# Patient Record
Sex: FEMALE | Race: BLACK OR AFRICAN AMERICAN | Employment: FULL TIME | URBAN - METROPOLITAN AREA
[De-identification: names, ages, dates, MRNs, and addresses within clinical notes are randomized per-mention and may not be internally consistent; named-entity substitution may affect disease eponyms.]

---

## 2021-07-21 ENCOUNTER — APPOINTMENT (OUTPATIENT)
Dept: NON INVASIVE DIAGNOSTICS | Age: 61
End: 2021-07-21
Attending: HOSPITALIST
Payer: COMMERCIAL

## 2021-07-21 ENCOUNTER — APPOINTMENT (OUTPATIENT)
Dept: GENERAL RADIOLOGY | Age: 61
End: 2021-07-21
Attending: EMERGENCY MEDICINE
Payer: COMMERCIAL

## 2021-07-21 ENCOUNTER — HOSPITAL ENCOUNTER (OUTPATIENT)
Age: 61
Setting detail: OBSERVATION
Discharge: HOME | End: 2021-07-22
Attending: EMERGENCY MEDICINE | Admitting: HOSPITALIST
Payer: COMMERCIAL

## 2021-07-21 DIAGNOSIS — R07.9 ACUTE CHEST PAIN: Primary | ICD-10-CM

## 2021-07-21 LAB
ALBUMIN SERPL-MCNC: 3.8 G/DL (ref 3.4–5)
ALBUMIN/GLOB SERPL: 0.9 {RATIO} (ref 0.8–1.7)
ALP SERPL-CCNC: 89 U/L (ref 45–117)
ALT SERPL-CCNC: 26 U/L (ref 13–56)
AMPHET UR QL SCN: NEGATIVE
ANION GAP SERPL CALC-SCNC: 4 MMOL/L (ref 3–18)
AST SERPL-CCNC: 21 U/L (ref 10–38)
ATRIAL RATE: 57 BPM
ATRIAL RATE: 68 BPM
ATRIAL RATE: 94 BPM
BARBITURATES UR QL SCN: NEGATIVE
BASOPHILS # BLD: 0 K/UL (ref 0–0.1)
BASOPHILS NFR BLD: 0 % (ref 0–2)
BENZODIAZ UR QL: NEGATIVE
BILIRUB SERPL-MCNC: 0.5 MG/DL (ref 0.2–1)
BUN SERPL-MCNC: 12 MG/DL (ref 7–18)
BUN/CREAT SERPL: 14 (ref 12–20)
CALCIUM SERPL-MCNC: 9 MG/DL (ref 8.5–10.1)
CALCULATED P AXIS, ECG09: 29 DEGREES
CALCULATED P AXIS, ECG09: 30 DEGREES
CALCULATED P AXIS, ECG09: 62 DEGREES
CALCULATED R AXIS, ECG10: 19 DEGREES
CALCULATED R AXIS, ECG10: 34 DEGREES
CALCULATED R AXIS, ECG10: 37 DEGREES
CALCULATED T AXIS, ECG11: 37 DEGREES
CALCULATED T AXIS, ECG11: 53 DEGREES
CALCULATED T AXIS, ECG11: 59 DEGREES
CANNABINOIDS UR QL SCN: NEGATIVE
CHLORIDE SERPL-SCNC: 105 MMOL/L (ref 100–111)
CK MB CFR SERPL CALC: 0.7 % (ref 0–4)
CK MB CFR SERPL CALC: 0.9 % (ref 0–4)
CK MB CFR SERPL CALC: 1 % (ref 0–4)
CK MB SERPL-MCNC: 1.1 NG/ML (ref 5–25)
CK MB SERPL-MCNC: 1.2 NG/ML (ref 5–25)
CK MB SERPL-MCNC: 1.8 NG/ML (ref 5–25)
CK SERPL-CCNC: 121 U/L (ref 26–192)
CK SERPL-CCNC: 172 U/L (ref 26–192)
CK SERPL-CCNC: 187 U/L (ref 26–192)
CO2 SERPL-SCNC: 30 MMOL/L (ref 21–32)
COCAINE UR QL SCN: NEGATIVE
CREAT SERPL-MCNC: 0.85 MG/DL (ref 0.6–1.3)
D DIMER PPP FEU-MCNC: 0.35 UG/ML(FEU)
DIAGNOSIS, 93000: NORMAL
DIFFERENTIAL METHOD BLD: ABNORMAL
ECHO AO ASC DIAM: 2.89 CM
ECHO AO ROOT DIAM: 3.29 CM
ECHO AV AREA PEAK VELOCITY: 2.76 CM2
ECHO AV AREA VTI: 2.46 CM2
ECHO AV AREA/BSA PEAK VELOCITY: 1.4 CM2/M2
ECHO AV AREA/BSA VTI: 1.3 CM2/M2
ECHO AV MEAN GRADIENT: 3.65 MMHG
ECHO AV PEAK GRADIENT: 7.16 MMHG
ECHO AV PEAK VELOCITY: 133.82 CM/S
ECHO AV VTI: 28.93 CM
ECHO IVC PROX: 1.36 CM
ECHO LA MAJOR AXIS: 3.22 CM
ECHO LA MINOR AXIS: 1.64 CM
ECHO LA VOL 2C: 49.14 ML (ref 22–52)
ECHO LA VOL 4C: 30.5 ML (ref 22–52)
ECHO LA VOL BP: 44.39 ML (ref 22–52)
ECHO LA VOL/BSA BIPLANE: 22.65 ML/M2 (ref 16–28)
ECHO LA VOLUME INDEX A2C: 25.07 ML/M2 (ref 16–28)
ECHO LA VOLUME INDEX A4C: 15.56 ML/M2 (ref 16–28)
ECHO LV E' LATERAL VELOCITY: 11 CM/S
ECHO LV E' SEPTAL VELOCITY: 7 CM/S
ECHO LV EDV A2C: 95.12 ML
ECHO LV EDV A4C: 94.78 ML
ECHO LV EDV BP: 95.94 ML (ref 56–104)
ECHO LV EDV INDEX A4C: 48.4 ML/M2
ECHO LV EDV INDEX BP: 48.9 ML/M2
ECHO LV EDV NDEX A2C: 48.5 ML/M2
ECHO LV EJECTION FRACTION A2C: 73 PERCENT
ECHO LV EJECTION FRACTION A4C: 51 PERCENT
ECHO LV EJECTION FRACTION BIPLANE: 63.7 PERCENT (ref 55–100)
ECHO LV ESV A2C: 26.12 ML
ECHO LV ESV A4C: 46.37 ML
ECHO LV ESV BP: 34.84 ML (ref 19–49)
ECHO LV ESV INDEX A2C: 13.3 ML/M2
ECHO LV ESV INDEX A4C: 23.7 ML/M2
ECHO LV ESV INDEX BP: 17.8 ML/M2
ECHO LV INTERNAL DIMENSION DIASTOLIC: 4.15 CM (ref 3.9–5.3)
ECHO LV INTERNAL DIMENSION SYSTOLIC: 2.6 CM
ECHO LV IVSD: 0.91 CM (ref 0.6–0.9)
ECHO LV MASS 2D: 119.1 G (ref 67–162)
ECHO LV MASS INDEX 2D: 60.7 G/M2 (ref 43–95)
ECHO LV POSTERIOR WALL DIASTOLIC: 0.92 CM (ref 0.6–0.9)
ECHO LVOT DIAM: 2.11 CM
ECHO LVOT PEAK GRADIENT: 4.49 MMHG
ECHO LVOT PEAK VELOCITY: 105.89 CM/S
ECHO LVOT SV: 69 ML
ECHO LVOT SV: 71.3 ML
ECHO LVOT VTI: 20.43 CM
ECHO MV A VELOCITY: 49.15 CM/S
ECHO MV AREA PHT: 3.13 CM2
ECHO MV E DECELERATION TIME (DT): 242.54 MS
ECHO MV E VELOCITY: 75.02 CM/S
ECHO MV E/A RATIO: 1.53
ECHO MV E/E' LATERAL: 6.82
ECHO MV E/E' RATIO (AVERAGED): 8.77
ECHO MV E/E' SEPTAL: 10.72
ECHO MV PRESSURE HALF TIME (PHT): 70.34 MS
ECHO RA AREA 4C: 14.42 CM2
ECHO RV INTERNAL DIMENSION: 4.07 CM
EOSINOPHIL # BLD: 0.1 K/UL (ref 0–0.4)
EOSINOPHIL NFR BLD: 1 % (ref 0–5)
ERYTHROCYTE [DISTWIDTH] IN BLOOD BY AUTOMATED COUNT: 13.2 % (ref 11.6–14.5)
GLOBULIN SER CALC-MCNC: 4.3 G/DL (ref 2–4)
GLUCOSE BLD STRIP.AUTO-MCNC: 115 MG/DL (ref 70–110)
GLUCOSE SERPL-MCNC: 86 MG/DL (ref 74–99)
HCT VFR BLD AUTO: 35.2 % (ref 35–45)
HDSCOM,HDSCOM: NORMAL
HGB BLD-MCNC: 11.7 G/DL (ref 12–16)
LVOT MG: 1.89 MMHG
LYMPHOCYTES # BLD: 3 K/UL (ref 0.9–3.6)
LYMPHOCYTES NFR BLD: 44 % (ref 21–52)
MCH RBC QN AUTO: 29.9 PG (ref 24–34)
MCHC RBC AUTO-ENTMCNC: 33.2 G/DL (ref 31–37)
MCV RBC AUTO: 90 FL (ref 74–97)
METHADONE UR QL: NEGATIVE
MONOCYTES # BLD: 0.5 K/UL (ref 0.05–1.2)
MONOCYTES NFR BLD: 7 % (ref 3–10)
NEUTS SEG # BLD: 3.3 K/UL (ref 1.8–8)
NEUTS SEG NFR BLD: 48 % (ref 40–73)
OPIATES UR QL: NEGATIVE
P-R INTERVAL, ECG05: 104 MS
P-R INTERVAL, ECG05: 122 MS
P-R INTERVAL, ECG05: 156 MS
PCP UR QL: NEGATIVE
PLATELET # BLD AUTO: 243 K/UL (ref 135–420)
PMV BLD AUTO: 12.4 FL (ref 9.2–11.8)
POTASSIUM SERPL-SCNC: 3.9 MMOL/L (ref 3.5–5.5)
PROT SERPL-MCNC: 8.1 G/DL (ref 6.4–8.2)
Q-T INTERVAL, ECG07: 442 MS
Q-T INTERVAL, ECG07: 442 MS
Q-T INTERVAL, ECG07: 488 MS
QRS DURATION, ECG06: 80 MS
QRS DURATION, ECG06: 80 MS
QRS DURATION, ECG06: 90 MS
QTC CALCULATION (BEZET), ECG08: 469 MS
QTC CALCULATION (BEZET), ECG08: 474 MS
QTC CALCULATION (BEZET), ECG08: 552 MS
RBC # BLD AUTO: 3.91 M/UL (ref 4.2–5.3)
SODIUM SERPL-SCNC: 139 MMOL/L (ref 136–145)
TROPONIN I SERPL-MCNC: <0.02 NG/ML (ref 0–0.04)
VENTRICULAR RATE, ECG03: 57 BPM
VENTRICULAR RATE, ECG03: 68 BPM
VENTRICULAR RATE, ECG03: 94 BPM
WBC # BLD AUTO: 6.9 K/UL (ref 4.6–13.2)

## 2021-07-21 PROCEDURE — 99218 HC RM OBSERVATION: CPT

## 2021-07-21 PROCEDURE — 82553 CREATINE MB FRACTION: CPT

## 2021-07-21 PROCEDURE — 74011000250 HC RX REV CODE- 250: Performed by: EMERGENCY MEDICINE

## 2021-07-21 PROCEDURE — 99285 EMERGENCY DEPT VISIT HI MDM: CPT

## 2021-07-21 PROCEDURE — 96372 THER/PROPH/DIAG INJ SC/IM: CPT

## 2021-07-21 PROCEDURE — 74011250637 HC RX REV CODE- 250/637: Performed by: INTERNAL MEDICINE

## 2021-07-21 PROCEDURE — 80307 DRUG TEST PRSMV CHEM ANLYZR: CPT

## 2021-07-21 PROCEDURE — 93005 ELECTROCARDIOGRAM TRACING: CPT

## 2021-07-21 PROCEDURE — 74011250636 HC RX REV CODE- 250/636: Performed by: EMERGENCY MEDICINE

## 2021-07-21 PROCEDURE — 74011250637 HC RX REV CODE- 250/637: Performed by: EMERGENCY MEDICINE

## 2021-07-21 PROCEDURE — 85379 FIBRIN DEGRADATION QUANT: CPT

## 2021-07-21 PROCEDURE — 74011250637 HC RX REV CODE- 250/637: Performed by: HOSPITALIST

## 2021-07-21 PROCEDURE — 96374 THER/PROPH/DIAG INJ IV PUSH: CPT

## 2021-07-21 PROCEDURE — 80053 COMPREHEN METABOLIC PANEL: CPT

## 2021-07-21 PROCEDURE — 82962 GLUCOSE BLOOD TEST: CPT

## 2021-07-21 PROCEDURE — 85025 COMPLETE CBC W/AUTO DIFF WBC: CPT

## 2021-07-21 PROCEDURE — 71045 X-RAY EXAM CHEST 1 VIEW: CPT

## 2021-07-21 PROCEDURE — 74011250636 HC RX REV CODE- 250/636: Performed by: HOSPITALIST

## 2021-07-21 PROCEDURE — 93306 TTE W/DOPPLER COMPLETE: CPT

## 2021-07-21 RX ORDER — PANTOPRAZOLE SODIUM 40 MG/1
40 TABLET, DELAYED RELEASE ORAL
Status: DISCONTINUED | OUTPATIENT
Start: 2021-07-22 | End: 2021-07-22 | Stop reason: HOSPADM

## 2021-07-21 RX ORDER — ACETAMINOPHEN 325 MG/1
650 TABLET ORAL
Status: DISCONTINUED | OUTPATIENT
Start: 2021-07-21 | End: 2021-07-22 | Stop reason: HOSPADM

## 2021-07-21 RX ORDER — NITROGLYCERIN 0.4 MG/1
0.4 TABLET SUBLINGUAL
Status: COMPLETED | OUTPATIENT
Start: 2021-07-21 | End: 2021-07-21

## 2021-07-21 RX ORDER — GUAIFENESIN 100 MG/5ML
81 LIQUID (ML) ORAL DAILY
Status: DISCONTINUED | OUTPATIENT
Start: 2021-07-22 | End: 2021-07-22 | Stop reason: HOSPADM

## 2021-07-21 RX ORDER — ENOXAPARIN SODIUM 100 MG/ML
40 INJECTION SUBCUTANEOUS EVERY 24 HOURS
Status: DISCONTINUED | OUTPATIENT
Start: 2021-07-21 | End: 2021-07-22 | Stop reason: HOSPADM

## 2021-07-21 RX ORDER — NITROGLYCERIN 0.4 MG/1
0.4 TABLET SUBLINGUAL
Status: DISCONTINUED | OUTPATIENT
Start: 2021-07-21 | End: 2021-07-22 | Stop reason: HOSPADM

## 2021-07-21 RX ORDER — AMLODIPINE BESYLATE 10 MG/1
TABLET ORAL DAILY
COMMUNITY

## 2021-07-21 RX ORDER — AMLODIPINE BESYLATE 5 MG/1
10 TABLET ORAL DAILY
Status: DISCONTINUED | OUTPATIENT
Start: 2021-07-22 | End: 2021-07-22 | Stop reason: HOSPADM

## 2021-07-21 RX ORDER — GUAIFENESIN 100 MG/5ML
243 LIQUID (ML) ORAL
Status: COMPLETED | OUTPATIENT
Start: 2021-07-21 | End: 2021-07-21

## 2021-07-21 RX ORDER — HEPARIN SODIUM 5000 [USP'U]/ML
5000 INJECTION, SOLUTION INTRAVENOUS; SUBCUTANEOUS EVERY 8 HOURS
Status: DISCONTINUED | OUTPATIENT
Start: 2021-07-21 | End: 2021-07-21

## 2021-07-21 RX ORDER — FAMOTIDINE 10 MG/ML
20 INJECTION INTRAVENOUS
Status: COMPLETED | OUTPATIENT
Start: 2021-07-21 | End: 2021-07-21

## 2021-07-21 RX ORDER — NITROGLYCERIN 20 MG/1
1 PATCH TRANSDERMAL DAILY
Status: DISCONTINUED | OUTPATIENT
Start: 2021-07-22 | End: 2021-07-22 | Stop reason: HOSPADM

## 2021-07-21 RX ORDER — ACETAMINOPHEN 325 MG/1
650 TABLET ORAL 2 TIMES DAILY
Status: DISCONTINUED | OUTPATIENT
Start: 2021-07-21 | End: 2021-07-22 | Stop reason: HOSPADM

## 2021-07-21 RX ADMIN — ASPIRIN 243 MG: 81 TABLET, CHEWABLE ORAL at 09:33

## 2021-07-21 RX ADMIN — FAMOTIDINE 20 MG: 10 INJECTION, SOLUTION INTRAVENOUS at 11:27

## 2021-07-21 RX ADMIN — ALUMINUM HYDROXIDE, MAGNESIUM HYDROXIDE, AND SIMETHICONE 40 ML: 200; 200; 20 SUSPENSION ORAL at 11:27

## 2021-07-21 RX ADMIN — ACETAMINOPHEN 650 MG: 325 TABLET ORAL at 18:45

## 2021-07-21 RX ADMIN — NITROGLYCERIN 0.4 MG: 0.4 TABLET, ORALLY DISINTEGRATING SUBLINGUAL at 09:34

## 2021-07-21 RX ADMIN — ENOXAPARIN SODIUM 40 MG: 40 INJECTION SUBCUTANEOUS at 13:53

## 2021-07-21 RX ADMIN — NITROGLYCERIN 0.4 MG: 0.4 TABLET, ORALLY DISINTEGRATING SUBLINGUAL at 17:10

## 2021-07-21 NOTE — ED PROVIDER NOTES
EMERGENCY DEPARTMENT HISTORY AND PHYSICAL EXAM    Date: 2021  Patient Name: Fritz Ayers    History of Presenting Illness     Chief Complaint   Patient presents with    Chest Pain         History Provided By: Patient    8:54 AM  Fritz Ayers is a 64 y.o. female with PMHX of hypertension who presents to the emergency department C/O chest pain. Per patient when she woke this morning between 6 and 630 she noticed pain in the left side of her chest.  She describes it as sharp and rates it a 7 out of 10 without clear relieving or exacerbating factors. She reports associated with some shortness of breath. She denies any fever, cough, nausea, vomiting, lower extremity edema, sick contacts, recent travel. She does not smoke. She has been vaccinated against COVID-19. She report her mother  from a heart attack in her 46s. PCP: Sunil Pereira MD    Current Facility-Administered Medications   Medication Dose Route Frequency Provider Last Rate Last [facility] ON 2021] aspirin chewable tablet 81 mg  81 mg Oral DAILY Andrew Pacheco MD        nitroglycerin (NITROSTAT) tablet 0.4 mg  0.4 mg SubLINGual Q5MIN PRN Andrew Pacheco MD        acetaminophen (TYLENOL) tablet 650 mg  650 mg Oral Q4H PRN Andrew Pacheco MD        heparin (porcine) injection 5,000 Units  5,000 Units SubCUTAneous Q8H Andrew Pacheco MD        enoxaparin (LOVENOX) injection 40 mg  40 mg SubCUTAneous Q24H Andrew Pacheco MD         Current Outpatient Medications   Medication Sig Dispense Refill    LOSARTAN PO Take  by mouth.  amLODIPine (NORVASC) 10 mg tablet Take  by mouth daily. Past History     Past Medical History:  Past Medical History:   Diagnosis Date    Hypertension        Past Surgical History:  History reviewed. No pertinent surgical history. Family History:  History reviewed. No pertinent family history.     Social History:  Social History     Tobacco Use    Smoking status: Never Smoker    Smokeless tobacco: Never Used Substance Use Topics    Alcohol use: Yes    Drug use: Never       Allergies: Allergies   Allergen Reactions    Morphine Itching         Review of Systems   Review of Systems   Constitutional: Negative for fever. Respiratory: Positive for shortness of breath. Cardiovascular: Positive for chest pain. Gastrointestinal: Negative for abdominal pain. All other systems reviewed and are negative.         Physical Exam     Vitals:    07/21/21 1200 07/21/21 1215 07/21/21 1230 07/21/21 1300   BP: (!) 148/67 (!) 127/112 (!) 150/76 (!) 158/74   Pulse: (!) 56 (!) 59 (!) 57 67   Resp: 16 15 13 12   Temp:       SpO2:       Weight:       Height:         Physical Exam    Nursing notes and vital signs reviewed    Constitutional: Non toxic appearing, moderate distress  Head: Normocephalic, Atraumatic  Eyes: EOMI  Neck: Supple  Cardiovascular: Regular rate and rhythm, no murmurs, rubs, or gallops  Chest: Normal work of breathing and chest excursion bilaterally  Lungs: Clear to ausculation bilaterally  Abdomen: Soft, non tender, non distended  Back: No evidence of trauma or deformity  Extremities: No evidence of trauma or deformity, mild bilateral LE edema  Skin: Warm and dry, normal cap refill  Neuro: Alert and appropriate  Psychiatric: Normal mood and affect      Diagnostic Study Results     Labs -     Recent Results (from the past 12 hour(s))   EKG, 12 LEAD, INITIAL    Collection Time: 07/21/21  8:58 AM   Result Value Ref Range    Ventricular Rate 68 BPM    Atrial Rate 68 BPM    P-R Interval 104 ms    QRS Duration 90 ms    Q-T Interval 442 ms    QTC Calculation (Bezet) 469 ms    Calculated P Axis 30 degrees    Calculated R Axis 34 degrees    Calculated T Axis 59 degrees    Diagnosis       Sinus rhythm with short [place]  Septal infarct , age undetermined  Abnormal ECG  No previous ECGs available     CBC WITH AUTOMATED DIFF    Collection Time: 07/21/21  9:30 AM   Result Value Ref Range    WBC 6.9 4.6 - 13.2 K/uL    RBC 3.91 (L) 4.20 - 5.30 M/uL    HGB 11.7 (L) 12.0 - 16.0 g/dL    HCT 35.2 35.0 - 45.0 %    MCV 90.0 74.0 - 97.0 FL    MCH 29.9 24.0 - 34.0 PG    MCHC 33.2 31.0 - 37.0 g/dL    RDW 13.2 11.6 - 14.5 %    PLATELET 979 451 - 923 K/uL    MPV 12.4 (H) 9.2 - 11.8 FL    NEUTROPHILS 48 40 - 73 %    LYMPHOCYTES 44 21 - 52 %    MONOCYTES 7 3 - 10 %    EOSINOPHILS 1 0 - 5 %    BASOPHILS 0 0 - 2 %    ABS. NEUTROPHILS 3.3 1.8 - 8.0 K/UL    ABS. LYMPHOCYTES 3.0 0.9 - 3.6 K/UL    ABS. MONOCYTES 0.5 0.05 - 1.2 K/UL    ABS. EOSINOPHILS 0.1 0.0 - 0.4 K/UL    ABS. BASOPHILS 0.0 0.0 - 0.1 K/UL    DF AUTOMATED     METABOLIC PANEL, COMPREHENSIVE    Collection Time: 07/21/21  9:30 AM   Result Value Ref Range    Sodium 139 136 - 145 mmol/L    Potassium 3.9 3.5 - 5.5 mmol/L    Chloride 105 100 - 111 mmol/L    CO2 30 21 - 32 mmol/L    Anion gap 4 3.0 - 18 mmol/L    Glucose 86 74 - 99 mg/dL    BUN 12 7.0 - 18 MG/DL    Creatinine 0.85 0.6 - 1.3 MG/DL    BUN/Creatinine ratio 14 12 - 20      GFR est AA >60 >60 ml/min/1.73m2    GFR est non-AA >60 >60 ml/min/1.73m2    Calcium 9.0 8.5 - 10.1 MG/DL    Bilirubin, total 0.5 0.2 - 1.0 MG/DL    ALT (SGPT) 26 13 - 56 U/L    AST (SGOT) 21 10 - 38 U/L    Alk.  phosphatase 89 45 - 117 U/L    Protein, total 8.1 6.4 - 8.2 g/dL    Albumin 3.8 3.4 - 5.0 g/dL    Globulin 4.3 (H) 2.0 - 4.0 g/dL    A-G Ratio 0.9 0.8 - 1.7     D DIMER    Collection Time: 07/21/21  9:30 AM   Result Value Ref Range    D DIMER 0.35 <0.46 ug/ml(FEU)   CARDIAC PANEL,(CK, CKMB & TROPONIN)    Collection Time: 07/21/21  9:30 AM   Result Value Ref Range    CK - MB 1.8 <3.6 ng/ml    CK-MB Index 1.0 0.0 - 4.0 %     26 - 192 U/L    Troponin-I, QT <0.02 0.0 - 0.045 NG/ML   CARDIAC PANEL,(CK, CKMB & TROPONIN)    Collection Time: 07/21/21 11:00 AM   Result Value Ref Range    CK - MB 1.2 <3.6 ng/ml    CK-MB Index 0.7 0.0 - 4.0 %     26 - 192 U/L    Troponin-I, QT <0.02 0.0 - 0.045 NG/ML   EKG, 12 LEAD, SUBSEQUENT    Collection Time: 07/21/21 11:32 AM   Result Value Ref Range    Ventricular Rate 57 BPM    Atrial Rate 57 BPM    P-R Interval 156 ms    QRS Duration 80 ms    Q-T Interval 488 ms    QTC Calculation (Bezet) 474 ms    Calculated P Axis 62 degrees    Calculated R Axis 37 degrees    Calculated T Axis 53 degrees    Diagnosis       Sinus bradycardia with sinus arrhythmia  Otherwise normal ECG  When compared with ECG of 21-JUL-2021 08:58,  MI interval has increased         Radiologic Studies -   XR CHEST PORT   Final Result      No acute cardiopulmonary abnormality. CT Results  (Last 48 hours)    None        CXR Results  (Last 48 hours)               07/21/21 0914  XR CHEST PORT Final result    Impression:      No acute cardiopulmonary abnormality. Narrative:  EXAM: XR CHEST PORT       CLINICAL INDICATION/HISTORY: chest pain   -Additional: None       COMPARISON: None       TECHNIQUE: Portable frontal view of the chest       _______________       FINDINGS:       SUPPORT DEVICES: None. HEART AND MEDIASTINUM: Cardiomediastinal silhouette within normal limits.        LUNGS AND PLEURAL SPACES: No dense consolidation, large effusion or   pneumothorax.       _______________                 Medications given in the ED-  Medications   aspirin chewable tablet 81 mg (has no administration in time range)   nitroglycerin (NITROSTAT) tablet 0.4 mg (has no administration in time range)   acetaminophen (TYLENOL) tablet 650 mg (has no administration in time range)   heparin (porcine) injection 5,000 Units (has no administration in time range)   enoxaparin (LOVENOX) injection 40 mg (has no administration in time range)   aspirin chewable tablet 243 mg (243 mg Oral Given 7/21/21 0933)   nitroglycerin (NITROSTAT) tablet 0.4 mg (0.4 mg SubLINGual Given 7/21/21 0934)   famotidine (PF) (PEPCID) injection 20 mg (20 mg IntraVENous Given 7/21/21 1127)   mylanta/viscous lidocaine (GI COCKTAIL) (40 mL Oral Given 7/21/21 1127)         Medical Decision Making I am the first provider for this patient. I reviewed the vital signs, available nursing notes, past medical history, past surgical history, family history and social history. Vital Signs-Reviewed the patient's vital signs. Pulse Oximetry Analysis - 99% on room air, not hypoxic     Cardiac Monitor:  Rate: 75 bpm  Rhythm: Normal sinus    EKG interpretation: (Preliminary)  EKG read by Dr. Jaqui Pack at 9:02 AM  Sinus rhythm at a rate of 68 bpm, GA interval 104 ms, QRS duration of 90 ms, no prior available for comparison    Repeat EKG interpretation: (Preliminary)  EKG read by Dr. Jaqui Pack at 7:38 AM  Sinus bradycardia at a rate of 56 bpm, GA interval 120 ms, QRS duration of 78 ms, no significant change when compared to prior    Records Reviewed: Nursing Notes    Provider Notes (Medical Decision Making): Shanita Gutierrez is a 64 y.o. female presenting for chest pain that started today. Patient has some risk factors including hypertension and significant family history of heart disease in her mother. Cardiac enzymes and EKG negative x2. Wells low risk, PERC positive, D-dimer negative. Patient hemodynamically stable and chest pain improved here but has not completely resolved. Discussed with cardiology and hospitalist for further in-hospital evaluation and management. Patient and her  understand and agree with this plan. Procedures:  Procedures    ED Course:   10:24 AM  Updated patient on all results and plan. All questions answered. Reports that her pain has improved to 4 out of 10 from originally 7 out of 10 but has not completely resolved. CONSULT NOTE:   10:38 AM  Dr. Jaqui Pack spoke with Dr. Robert Graham   Specialty: Cardiology  Discussed pt's hx, disposition, and available diagnostic and imaging results over the telephone. Reviewed care plans. Repeat EKG and cardiac panel, trial GI cocktail.        CONSULT NOTE:   12:43 PM  Dr. Jaqui Pack spoke with Dr. Robert Graham   Specialty: Cardiology  Discussed pt's hx, disposition, and available diagnostic and imaging results over the telephone. Reviewed care plans. Will consult on the patient and arrange for further in hospital cardiac risk stratification. 12:46 PM  Updated patient on all results and plan. All questions answered. CONSULT NOTE:   12:59 PM  Dr. Tremaine Almazan spoke with Dr. Erica Moreno   Specialty: Hospitalist  Discussed pt's hx, disposition, and available diagnostic and imaging results over the telephone. Reviewed care plans. Accepts to telemetry. Diagnosis and Disposition     Critical Care Time: None    Core Measures:  For Hospitalized Patients:    1. Hospitalization Decision Time:  The decision to hospitalize the patient was made by Dr. Tremaine Almazan at 12:46 PM on 7/21/2021    2. Aspirin: Aspirin was given    12:47 PM  Patient is being admitted to the hospital by Dr. Erica Moreno. The results of their tests and reasons for their admission have been discussed with them and/or available family. They convey agreement and understanding for the need to be admitted and for their admission diagnosis. CONDITIONS ON ADMISSION:  Sepsis is not present at the time of admission. Deep Vein Thrombosis is not present at the time of admission. Thrombosis is not present at the time of admission. Urinary Tract Infection is not present at the time of admission. Pneumonia is not present at the time of admission. MRSA is not present at the time of admission. Wound infection is not present at the time of admission. Pressure Ulcer is not present at the time of admission. CLINICAL IMPRESSION:    1. Acute chest pain      _______________________________      Please note that this dictation was completed with [employer], the [employer] voice recognition software. Quite often unanticipated grammatical, syntax, homophones, and other interpretive errors are inadvertently transcribed by the computer software. Please disregard these errors.   Please excuse any errors that have escaped final proofreading.

## 2021-07-21 NOTE — ED NOTES
Spoke with provider Kenan Urias to clarify order if pt needed to be on both heparin and lovenox sc. Provider to change order does not need to be on both, and will update when next set of labs are do for cardiac panel.

## 2021-07-21 NOTE — H&P
History & Physical    Patient: Ras Quigley MRN: [id number]  CSN: [id number]    YOB: 1960  Age: 64 y.o. Sex: female      DOA: 7/21/2021  Primary Care Provider:  Gaurav Correa MD      Assessment/Plan     Hospital Problems  Never Reviewed        Codes Class Noted POA    Chest pain ICD-10-CM: R07.9  ICD-9-CM: 786.50  7/21/2021 Unknown        Hypertension ICD-10-CM: I10  ICD-9-CM: 401.9  Unknown Unknown        GERD (gastroesophageal reflux disease) ICD-10-CM: K21.9  ICD-9-CM: 530.81  Unknown Unknown                Admit to tele   Chest pain  Cardiac monitor, ce trend need to r/o acs   Ekg: no st change at this point,  nitro prn for chest pain  Cardiology consult   Echo done in ER   Need cath vs stress test will defer to cardiology        HTN, accelerated  Continue home medication. gerd ppi      full code   Please note that this dictation was completed with [employer], the computer voice recognition software. Quite often unanticipated grammatical, syntax, homophones, and other interpretive errors are inadvertently transcribed by the computer software. Please disregard these errors. Please excuse any errors that have escaped final proofreading    Estimate  length of stay : 1-2 days     DVT : heparin ppi proph  CC: chest pain        HPI:     Ras Quigley is a 64 y.o. female with history of hypertension presented to ER due to chest pain this morning. The chest pain waking her up this morning. It was sharp pain left side of the chest, no palpitation, no shortness of breath. She rested her self and chest pain did not go away. She decided to come to the hospital. She reported that she had on and off chest pain before , but resolved per its own. It does not go away this time. On arrival, Aspirin was administrated with  Nitro and GI cocktail which  helps with the pain. First cardiac enzyme and D-dimer were negative. EKG no ST segment changes. Chest x-ray no acute process.   She reported she had gerd, drinks alcohol occasionally. Quitted smoking several years ago. Mother  from heart attack at age of 48. She had covid last December-not required hospitalization. She received covid vaccination.  was at the bedside. Visit Vitals  BP (!) 159/78   Pulse 77   Temp 97.1 °F (36.2 °C)   Resp 12   Ht 5' 5\" (1.651 m)   Wt 88.9 kg (196 lb)   SpO2 100%   BMI 32.62 kg/m²      O2 Device: None (Room air)      Past Medical History:   Diagnosis Date    Hypertension      Surgical History    Surgery Date Site/Laterality Comments   CHOLECYSTECTOMY 2015 - 2015        THYROIDECTOMY, PARTIAL          OTHER SURGICAL HISTORY     liver laceration     IN LAP,SURG,COLECTOMY, PARTIAL, W/ANAST 2/10/2020 Abdomen/N/A Procedure: COLON RESECTION - LAPAROSCOPIC RIGHT; Surgeon: Nikolai Sofia MD; Location: [facility] Main OR; Service:  [facility], [address] History    Medical History Date Comments   Stroke (CMS/HCC)       TIA (transient ischemic attack)       Hypertension       Serrated adenoma of colon       Anemia       Chest pain       Anxiety       Family History    Medical History Relation Name Comments   Brain Aneurysm Father       Dementia Father       Hypertension Father       Heart attack Mother       Hypertension Mother       Lupus Sister         Relation Name Status Comments   Father        Mother        Sister   Alive       Social History     Socioeconomic History    Marital status:      Spouse name: Not on file    Number of children: Not on file    Years of education: Not on file    Highest education level: Not on file   Tobacco Use    Smoking status: Never Smoker    Smokeless tobacco: Never Used   Substance and Sexual Activity    Alcohol use:  Yes    Drug use: Never     Social Determinants of Health     Financial Resource Strain:     Difficulty of Paying Living Expenses:    Food Insecurity:     Worried About Running Out of Food in the Last Year:     [address] in the Last Year:    Transportation Needs:     Lack of Transportation (Medical):  Lack of Transportation (Non-Medical):    Physical Activity:     Days of Exercise per Week:     Minutes of Exercise per Session:    Stress:     Feeling of Stress :    Social Connections:     Frequency of Communication with Friends and Family:     Frequency of Social Gatherings with Friends and Family:     Attends [employer] Services:     Active Member of Clubs or Organizations:     Attends Club or Organization Meetings:     Marital Status:        Prior to Admission medications    Medication Sig Start Date End Date Taking? Authorizing Provider   LOSARTAN PO Take  by mouth. Yes Other, MD Clarita   amLODIPine (NORVASC) 10 mg tablet Take  by mouth daily. Yes Other, MD Clarita       Allergies   Allergen Reactions    Morphine Itching       Review of Systems  Gen: No fever, chills, malaise, weight loss/gain. Heent: No headache, rhinorrhea, epistaxis, ear pain, hearing loss, sinus pain, neck pain/stiffness, sore throat. Heart: + chest pain, no palpitations, CERVANTES, pnd, or orthopnea. Resp: No cough, hemoptysis, wheezing and shortness of breath. GI: No nausea, vomiting, diarrhea, constipation, melena or hematochezia. : No urinary obstruction, dysuria or hematuria. Derm: No rash, new skin lesion or pruritis. Musc/skeletal: no bone or joint complains. Vasc: No edema, cyanosis or claudication. Endo: No heat/cold intolerance, no polyuria,polydipsia or polyphagia. Neuro: No unilateral weakness, numbness, tingling. No seizures. Heme: No easy bruising or bleeding. Physical Exam:     Physical Exam:  Visit Vitals  BP (!) 159/78   Pulse 77   Temp 97.1 °F (36.2 °C)   Resp 12   Ht 5' 5\" (1.651 m)   Wt 88.9 kg (196 lb)   SpO2 100%   BMI 32.62 kg/m²      O2 Device: None (Room air)    Temp (24hrs), Av.1 °F (36.2 °C), Min:97.1 °F (36.2 °C), Max:97.1 °F (36.2 °C)    No intake/output data recorded.    No intake/output data recorded. General:  Awake, cooperative, no distress. Head:  Normocephalic, without obvious abnormality, atraumatic. Eyes:  Conjunctivae/corneas clear, sclera anicteric, PERRL, EOMs intact. Nose: Nares normal. No drainage or sinus tenderness. Throat: Lips, mucosa, and tongue normal. .   Neck: Supple, symmetrical, trachea midline, no adenopathy. Lungs:   Clear to auscultation bilaterally. Heart:  Regular rate and rhythm, S1, S2 normal, no murmur, click, rub or gallop. Abdomen: Soft, non-tender. Bowel sounds normal. No masses,  No organomegaly. Extremities: Extremities normal, atraumatic, no cyanosis or edema. Pulses: 2+ and symmetric all extremities. Skin: Skin color-pink, texture, turgor normal. No rashes or lesions. Capillary refill normal    Neurologic: CNII-XII intact. No focal motor or sensory deficit.        Labs Reviewed:    BMP:   Lab Results   Component Value Date/Time     07/21/2021 09:30 AM    K 3.9 07/21/2021 09:30 AM     07/21/2021 09:30 AM    CO2 30 07/21/2021 09:30 AM    AGAP 4 07/21/2021 09:30 AM    GLU 86 07/21/2021 09:30 AM    BUN 12 07/21/2021 09:30 AM    CREA 0.85 07/21/2021 09:30 AM    GFRAA >60 07/21/2021 09:30 AM    GFRNA >60 07/21/2021 09:30 AM     CMP:   Lab Results   Component Value Date/Time     07/21/2021 09:30 AM    K 3.9 07/21/2021 09:30 AM     07/21/2021 09:30 AM    CO2 30 07/21/2021 09:30 AM    AGAP 4 07/21/2021 09:30 AM    GLU 86 07/21/2021 09:30 AM    BUN 12 07/21/2021 09:30 AM    CREA 0.85 07/21/2021 09:30 AM    GFRAA >60 07/21/2021 09:30 AM    GFRNA >60 07/21/2021 09:30 AM    CA 9.0 07/21/2021 09:30 AM    ALB 3.8 07/21/2021 09:30 AM    TP 8.1 07/21/2021 09:30 AM    GLOB 4.3 (H) 07/21/2021 09:30 AM    AGRAT 0.9 07/21/2021 09:30 AM    ALT 26 07/21/2021 09:30 AM     CBC:   Lab Results   Component Value Date/Time    WBC 6.9 07/21/2021 09:30 AM    HGB 11.7 (L) 07/21/2021 09:30 AM    HCT 35.2 07/21/2021 09:30 AM     07/21/2021 09:30 AM     All Cardiac Markers in the last 24 hours:   Lab Results   Component Value Date/Time     07/21/2021 11:00 AM     07/21/2021 09:30 AM    CKMB 1.2 07/21/2021 11:00 AM    CKMB 1.8 07/21/2021 09:30 AM    CKND1 0.7 07/21/2021 11:00 AM    CKND1 1.0 07/21/2021 09:30 AM    TROIQ <0.02 07/21/2021 11:00 AM    TROIQ <0.02 07/21/2021 09:30 AM     Recent Glucose Results:   Lab Results   Component Value Date/Time    GLU 86 07/21/2021 09:30 AM     ABG: No results found for: PH, PHI, PCO2, PCO2I, PO2, PO2I, HCO3, HCO3I, FIO2, FIO2I  COAGS: No results found for: APTT, PTP, INR, INREXT, INREXT  Liver Panel:   Lab Results   Component Value Date/Time    ALB 3.8 07/21/2021 09:30 AM    TP 8.1 07/21/2021 09:30 AM    GLOB 4.3 (H) 07/21/2021 09:30 AM    AGRAT 0.9 07/21/2021 09:30 AM    ALT 26 07/21/2021 09:30 AM    AP 89 07/21/2021 09:30 AM     Pancreatic Markers: No results found for: AMYLPOCT, AML, LIPPOCT, LPSE    XR CHEST PORT    Result Date: 7/21/2021  EXAM: XR CHEST PORT CLINICAL INDICATION/HISTORY: chest pain -Additional: None COMPARISON: None TECHNIQUE: Portable frontal view of the chest _______________ FINDINGS: SUPPORT DEVICES: None. HEART AND MEDIASTINUM: Cardiomediastinal silhouette within normal limits. LUNGS AND PLEURAL SPACES: No dense consolidation, large effusion or pneumothorax. _______________     No acute cardiopulmonary abnormality.     Procedures/imaging: see electronic medical records for all procedures/Xrays and details which were not copied into this note but were reviewed prior to creation of Slime Jesus MD, Internal Medicine     CC: Colman Ganser, MD

## 2021-07-21 NOTE — ROUTINE PROCESS
TRANSFER - OUT REPORT:    Verbal report given to colton(name) on Ignacia Chow  being transferred to telemetry(unit) for routine progression of care       Report consisted of patients Situation, Background, Assessment and   Recommendations(SBAR). Information from the following report(s) SBAR, ED Summary, MAR and Cardiac Rhythm NSR with Sinus arrhythmia was reviewed with the receiving nurse. Lines:   Peripheral IV 07/21/21 Right Forearm (Active)   Site Assessment Clean, dry, & intact 07/21/21 0932   Phlebitis Assessment 0 07/21/21 0932   Infiltration Assessment 0 07/21/21 0932   Dressing Status Clean, dry, & intact 07/21/21 0932   Dressing Type Transparent 07/21/21 0932        Opportunity for questions and clarification was provided.       Patient transported with:   Monitor  Registered Nurse

## 2021-07-21 NOTE — ED NOTES
Pt pending inpatient admission. clarified with provider Anest pt ok to have cardiac diet. Crackers and water given to pt.

## 2021-07-21 NOTE — CONSULTS
[facility] Consult Note      Patient: Iram Lora MRN: [id number]  SSN: [id number]    YOB: 1960  Age: 64 y.o. Sex: female        Date of Consultation: 7/21/2021  Referring Physician: Dr. Lucille Kehr  Reason for Consultation: chest pain. HPI:  I was asked by Dr. Lucille Kehr for chest pain. Karthikeyan scales is a 74-year-old pleasant patient with history of hypertension and family history of CAD came to the hospital with recurrent episode of chest pain since morning. Chest pain is precordial with in location intermittent aching and partially reproducible without any associated with shortness of breath, palpitation, presyncope and syncope. Patient have stress test and echocardiogram done in 2018 according to the patient in [facility]. Past Medical History:   Diagnosis Date    Hypertension      History reviewed. No pertinent surgical history. Current Facility-Administered Medications   Medication Dose Route Frequency    [START ON 7/22/2021] aspirin chewable tablet 81 mg  81 mg Oral DAILY    nitroglycerin (NITROSTAT) tablet 0.4 mg  0.4 mg SubLINGual Q5MIN PRN    acetaminophen (TYLENOL) tablet 650 mg  650 mg Oral Q4H PRN    enoxaparin (LOVENOX) injection 40 mg  40 mg SubCUTAneous Q24H    [START ON 7/22/2021] amLODIPine (NORVASC) tablet 10 mg  10 mg Oral DAILY    [START ON 7/22/2021] pantoprazole (PROTONIX) tablet 40 mg  40 mg Oral ACB    [START ON 7/22/2021] nitroglycerin (NITRODUR) 0.1 mg/hr patch 1 Patch  1 Patch TransDERmal DAILY    acetaminophen (TYLENOL) tablet 650 mg  650 mg Oral BID     Current Outpatient Medications   Medication Sig    LOSARTAN PO Take  by mouth.  amLODIPine (NORVASC) 10 mg tablet Take  by mouth daily. Allergies and Intolerances: Allergies   Allergen Reactions    Morphine Itching       Family History:   History reviewed. No pertinent family history. Social History:   She  reports that she has never smoked.  She has never used smokeless tobacco. She  reports current alcohol use. Review of Systems  Gen: No fever, chills, malaise, weight loss/gain. Heent: No headache, rhinorrhea, epistaxis, ear pain, hearing loss, sinus pain, neck pain/stiffness, sore throat. Heart: No chest pain, palpitations, CERVANTES, pnd, or orthopnea. Resp: No cough, hemoptysis, wheezing and shortness of breath. GI: No nausea, vomiting, diarrhea, constipation, melena or hematochezia. : No urinary obstruction, dysuria or hematuria. Derm: No rash, new skin lesion or pruritis. Musc/skeletal: no bone or joint complains. Vasc: No edema, cyanosis or claudication. Endo: No heat/cold intolerance, no polyuria,polydipsia or polyphagia. Neuro: No unilateral weakness, numbness, tingling. No seizures. Heme: No easy bruising or bleeding. Physical:   Patient Vitals for the past 6 hrs:   Temp Pulse Resp BP   07/21/21 1730 98 °F (36.7 °C) 72 14 130/69   07/21/21 1700 -- 76 14 (!) 140/72   07/21/21 1630 -- 77 12 (!) 159/78   07/21/21 1509 -- -- -- (!) 159/80   07/21/21 1500 -- 78 17 (!) 141/73   07/21/21 1430 -- 71 18 138/74   07/21/21 1400 -- 66 17 139/64   07/21/21 1300 -- 67 12 (!) 158/74   07/21/21 1230 -- (!) 57 13 (!) 150/76   07/21/21 1215 -- (!) 59 15 (!) 127/112   07/21/21 1200 -- (!) 56 16 (!) 148/67         Exam:   General Appearance: Comfortable, not using accessory muscles of respiration. HEENT: BILLY. HEAD: Atraumatic  NECK: No JVD, no thyroidomeglay. LUNGS: Clear bilaterally. HEART: S1+S2     ABD: Non-tender, BS Audible    EXT: No edema, and no cysnosis. VASCULAR EXAM: Pulses are intact. PSYCHIATRIC EXAM: Mood is appropriate. MUSCULOSKELETAL: Grossly no joint deformity. NEUROLOGICAL: Motor and sensory sytem intact and Cranial nerves II-XII intact.     Review of Data:   LABS:   Lab Results   Component Value Date/Time    WBC 6.9 07/21/2021 09:30 AM    HGB 11.7 (L) 07/21/2021 09:30 AM    HCT 35.2 07/21/2021 09:30 AM    PLATELET 267 47/64/6879 09:30 AM     Lab Results   Component Value Date/Time    Sodium 139 07/21/2021 09:30 AM    Potassium 3.9 07/21/2021 09:30 AM    Chloride 105 07/21/2021 09:30 AM    CO2 30 07/21/2021 09:30 AM    Glucose 86 07/21/2021 09:30 AM    BUN 12 07/21/2021 09:30 AM    Creatinine 0.85 07/21/2021 09:30 AM     No results found for: CHOL, CHOLX, CHLST, CHOLV, HDL, HDLP, LDL, LDLC, DLDLP, TGLX, TRIGL, TRIGP  No components found for: GPT  No results found for: HBA1C, DLF8CYNB, YWS1TLWO    RADIOLOGY:  CT Results  (Last 48 hours)    None        CXR Results  (Last 48 hours)               07/21/21 0914  XR CHEST PORT Final result    Impression:      No acute cardiopulmonary abnormality. Narrative:  EXAM: XR CHEST PORT       CLINICAL INDICATION/HISTORY: chest pain   -Additional: None       COMPARISON: None       TECHNIQUE: Portable frontal view of the chest       _______________       FINDINGS:       SUPPORT DEVICES: None. HEART AND MEDIASTINUM: Cardiomediastinal silhouette within normal limits.        LUNGS AND PLEURAL SPACES: No dense consolidation, large effusion or   pneumothorax.       _______________                   Cardiology Procedures:   Results for orders placed or performed during the hospital encounter of 07/21/21   EKG, 12 LEAD, INITIAL   Result Value Ref Range    Ventricular Rate 68 BPM    Atrial Rate 68 BPM    P-R Interval 104 ms    QRS Duration 90 ms    Q-T Interval 442 ms    QTC Calculation (Bezet) 469 ms    Calculated P Axis 30 degrees    Calculated R Axis 34 degrees    Calculated T Axis 59 degrees    Diagnosis       Sinus rhythm with short ME  Septal infarct , age undetermined  Abnormal ECG  No previous ECGs available        Echo Results  (Last 48 hours)    None       Cardiolite (Tc-99m Sestamibi) stress test        Impression / Plan:    Patient Active Problem List   Diagnosis Code    Chest pain R07.9    Hypertension I10    GERD (gastroesophageal reflux disease) K21.9       Assessment and plan    Chest pain  Hypertension   GERD    Plan. Patient's symptoms are consistent with mixed kind of symptoms probably of musculoskeletal with significant family history. Patient having recurrent symptoms be, ACS is ruled out. Start Tylenol  Start  Ativan  Continue antihypertensive treatment with amlodipine and continue with DVT prophylaxis Lovenox  Echocardiogram done, normal wall motion and EF. I will plan to do exercise nuclear stress test tomorrow. Management plan was discussed in detail with patient and .           Signed By: Collette Nails, MD     July 21, 2021

## 2021-07-21 NOTE — ED NOTES
Pt resting in bed, awakens easily to voice. remains of full monitor.  at bedside. Awaiting re-peat results for cardiac panel.

## 2021-07-22 ENCOUNTER — APPOINTMENT (OUTPATIENT)
Dept: NON INVASIVE DIAGNOSTICS | Age: 61
End: 2021-07-22
Attending: INTERNAL MEDICINE
Payer: COMMERCIAL

## 2021-07-22 ENCOUNTER — APPOINTMENT (OUTPATIENT)
Dept: NUCLEAR MEDICINE | Age: 61
End: 2021-07-22
Attending: INTERNAL MEDICINE
Payer: COMMERCIAL

## 2021-07-22 VITALS
HEIGHT: 65 IN | HEART RATE: 88 BPM | OXYGEN SATURATION: 100 % | WEIGHT: 196 LBS | SYSTOLIC BLOOD PRESSURE: 130 MMHG | RESPIRATION RATE: 16 BRPM | DIASTOLIC BLOOD PRESSURE: 69 MMHG | TEMPERATURE: 97.5 F | BODY MASS INDEX: 32.65 KG/M2

## 2021-07-22 LAB
ANION GAP SERPL CALC-SCNC: 5 MMOL/L (ref 3–18)
BUN SERPL-MCNC: 12 MG/DL (ref 7–18)
BUN/CREAT SERPL: 17 (ref 12–20)
CALCIUM SERPL-MCNC: 8.4 MG/DL (ref 8.5–10.1)
CHLORIDE SERPL-SCNC: 106 MMOL/L (ref 100–111)
CHOLEST SERPL-MCNC: 164 MG/DL
CK MB CFR SERPL CALC: NORMAL % (ref 0–4)
CK MB CFR SERPL CALC: NORMAL % (ref 0–4)
CK MB SERPL-MCNC: <1 NG/ML (ref 5–25)
CK MB SERPL-MCNC: <1 NG/ML (ref 5–25)
CK SERPL-CCNC: 122 U/L (ref 26–192)
CK SERPL-CCNC: 126 U/L (ref 26–192)
CO2 SERPL-SCNC: 29 MMOL/L (ref 21–32)
CREAT SERPL-MCNC: 0.72 MG/DL (ref 0.6–1.3)
GLUCOSE BLD STRIP.AUTO-MCNC: 100 MG/DL (ref 70–110)
GLUCOSE BLD STRIP.AUTO-MCNC: 101 MG/DL (ref 70–110)
GLUCOSE BLD STRIP.AUTO-MCNC: 82 MG/DL (ref 70–110)
GLUCOSE SERPL-MCNC: 85 MG/DL (ref 74–99)
HDLC SERPL-MCNC: 69 MG/DL (ref 40–60)
HDLC SERPL: 2.4 {RATIO} (ref 0–5)
LDLC SERPL CALC-MCNC: 74.4 MG/DL (ref 0–100)
LIPID PROFILE,FLP: ABNORMAL
POTASSIUM SERPL-SCNC: 3.3 MMOL/L (ref 3.5–5.5)
SODIUM SERPL-SCNC: 140 MMOL/L (ref 136–145)
STRESS BASELINE HR: 100 BPM
STRESS ESTIMATED WORKLOAD: 5.9 METS
STRESS EXERCISE DUR MIN: NORMAL
STRESS PEAK DIAS BP: 85 MMHG
STRESS PEAK SYS BP: 142 MMHG
STRESS PERCENT HR ACHIEVED: 93 %
STRESS POST PEAK HR: 150 BPM
STRESS RATE PRESSURE PRODUCT: NORMAL BPM*MMHG
STRESS ST DEPRESSION: 0 MM
STRESS ST ELEVATION: 0 MM
STRESS TARGET HR: 162 BPM
TRIGL SERPL-MCNC: 103 MG/DL (ref ?–150)
TROPONIN I SERPL-MCNC: <0.02 NG/ML (ref 0–0.04)
TROPONIN I SERPL-MCNC: <0.02 NG/ML (ref 0–0.04)
VLDLC SERPL CALC-MCNC: 20.6 MG/DL

## 2021-07-22 PROCEDURE — 36415 COLL VENOUS BLD VENIPUNCTURE: CPT

## 2021-07-22 PROCEDURE — 80048 BASIC METABOLIC PNL TOTAL CA: CPT

## 2021-07-22 PROCEDURE — 99218 HC RM OBSERVATION: CPT

## 2021-07-22 PROCEDURE — 74011250636 HC RX REV CODE- 250/636: Performed by: HOSPITALIST

## 2021-07-22 PROCEDURE — 82962 GLUCOSE BLOOD TEST: CPT

## 2021-07-22 PROCEDURE — 96372 THER/PROPH/DIAG INJ SC/IM: CPT

## 2021-07-22 PROCEDURE — 80061 LIPID PANEL: CPT

## 2021-07-22 PROCEDURE — 93017 CV STRESS TEST TRACING ONLY: CPT

## 2021-07-22 PROCEDURE — 74011250637 HC RX REV CODE- 250/637: Performed by: HOSPITALIST

## 2021-07-22 PROCEDURE — 82550 ASSAY OF CK (CPK): CPT

## 2021-07-22 PROCEDURE — 84484 ASSAY OF TROPONIN QUANT: CPT

## 2021-07-22 PROCEDURE — 74011250637 HC RX REV CODE- 250/637: Performed by: INTERNAL MEDICINE

## 2021-07-22 PROCEDURE — 82553 CREATINE MB FRACTION: CPT

## 2021-07-22 RX ADMIN — ASPIRIN 81 MG: 81 TABLET, CHEWABLE ORAL at 11:35

## 2021-07-22 RX ADMIN — AMLODIPINE BESYLATE 10 MG: 5 TABLET ORAL at 11:34

## 2021-07-22 RX ADMIN — ACETAMINOPHEN 650 MG: 325 TABLET ORAL at 11:35

## 2021-07-22 RX ADMIN — PANTOPRAZOLE SODIUM 40 MG: 40 TABLET, DELAYED RELEASE ORAL at 06:33

## 2021-07-22 RX ADMIN — ENOXAPARIN SODIUM 40 MG: 40 INJECTION SUBCUTANEOUS at 13:19

## 2021-07-22 NOTE — PROGRESS NOTES
[id number] Shift Summary: Pt rested well overnight with no complaints. No new clinical concerns noted.      Nightshift Chart Audit Completed

## 2021-07-22 NOTE — DISCHARGE SUMMARY
Discharge Summary    Patient: Richard Jones MRN: [id number]  CSN: [id number]    YOB: 1960  Age: 64 y.o. Sex: female    DOA: 7/21/2021 LOS:  LOS: 0 days   Discharge Date:      Primary Care Provider:  Zaira Falcon DO    Admission Diagnoses: Chest pain [R07.9]    Discharge Diagnoses:    Problem List as of 7/22/2021 Never Reviewed        Codes Class Noted - Resolved    * (Principal) Chest pain ICD-10-CM: R07.9  ICD-9-CM: 786.50  7/21/2021 - Present        Hypertension ICD-10-CM: I10  ICD-9-CM: 401.9  Unknown - Present        GERD (gastroesophageal reflux disease) ICD-10-CM: K21.9  ICD-9-CM: 530.81  Unknown - Present              Discharge Medications:     Current Discharge Medication List      CONTINUE these medications which have NOT CHANGED    Details   LOSARTAN PO Take  by mouth. amLODIPine (NORVASC) 10 mg tablet Take  by mouth daily. Discharge Condition: as tolerated    Procedures : none    Consults: Dr. Girish Sr, Cardiology       PHYSICAL EXAM   Visit Vitals  /69   Pulse 88   Temp 97.5 °F (36.4 °C)   Resp 16   Ht 5' 5\" (1.651 m)   Wt 88.9 kg (196 lb)   SpO2 100%   Breastfeeding No   BMI 32.62 kg/m²     General: Awake, cooperative, no acute distress    HEENT: NC, Atraumatic. PERRLA, EOMI. Anicteric sclerae. Lungs:  CTA Bilaterally. No Wheezing/Rhonchi/Rales. Heart:  Regular  rhythm,  No murmur, No Rubs, No Gallops  Abdomen: Soft, Non distended, Non tender. +Bowel sounds,   Extremities: No c/c/e  Psych:   Not anxious or agitated. Neurologic:  No acute neurological deficits. Admission HPI : Richard Jones is a 64 y.o. female with history of hypertension presented to ER due to chest pain this morning. The chest pain waking her up this morning. It was sharp pain left side of the chest, no palpitation, no shortness of breath. She rested her self and chest pain did not go away.  She decided to come to the hospital. She reported that she had on and off chest pain before , but resolved per its own. It does not go away this time. On arrival, Aspirin was administrated with  Nitro and GI cocktail which  helps with the pain. First cardiac enzyme and D-dimer were negative. EKG no ST segment changes. Chest x-ray no acute process. She reported she had gerd, drinks alcohol occasionally. Quitted smoking several years ago. Mother  from heart attack at age of 48. She had covid last December-not required hospitalization. She received covid vaccination.  was at the bedside. Hospital Course : Ricky Garrison a 64 y. o. female with history of hypertension and family history of coronary artery disease presented to ER due to chest pain. Admitted for chest pain and accelerated hypertension.      Chest pain-  Appreciate cardiology consult  ACS ruled out  Echo done, normal wall motion and EF  Exercise nuclear stress test today  Continue antihypertensive treatment with amlodipine     HTN, accelerated -  Blood pressure now controlled on amlodipine     GERD-  Continue PPI    Activity: as tolerated    Diet: cardiac    Follow-up: 2-3 weeks with PCP    Disposition: Home    Minutes spent on discharge: 35 minutes       Labs: Results:       Chemistry Recent Labs     21  0110 21  0930   GLU 85 86    139   K 3.3* 3.9    105   CO2 29 30   BUN 12 12   CREA 0.72 0.85   CA 8.4* 9.0   AGAP 5 4   BUCR 17 14   AP  --  89   TP  --  8.1   ALB  --  3.8   GLOB  --  4.3*   AGRAT  --  0.9      CBC w/Diff Recent Labs     21  0930   WBC 6.9   RBC 3.91*   HGB 11.7*   HCT 35.2      GRANS 48   LYMPH 44   EOS 1      Cardiac Enzymes Recent Labs     21  0740 21  0110    126   CKND1 CALCULATION NOT PERFORMED WHEN RESULT IS BELOW LINEAR LIMIT CALCULATION NOT PERFORMED WHEN RESULT IS BELOW LINEAR LIMIT      Coagulation No results for input(s): PTP, INR, APTT, INREXT in the last 72 hours.     Lipid Panel Lab Results Component Value Date/Time    Cholesterol, total 164 07/22/2021 01:10 AM    HDL Cholesterol 69 (H) 07/22/2021 01:10 AM    LDL, calculated 74.4 07/22/2021 01:10 AM    VLDL, calculated 20.6 07/22/2021 01:10 AM    Triglyceride 103 07/22/2021 01:10 AM    CHOL/HDL Ratio 2.4 07/22/2021 01:10 AM      BNP No results for input(s): BNPP in the last 72 hours. Liver Enzymes Recent Labs     07/21/21  0930   TP 8.1   ALB 3.8   AP 89      Thyroid Studies No results found for: T4, T3U, TSH, TSHEXT         Significant Diagnostic Studies: XR CHEST PORT    Result Date: 7/21/2021  EXAM: XR CHEST PORT CLINICAL INDICATION/HISTORY: chest pain -Additional: None COMPARISON: None TECHNIQUE: Portable frontal view of the chest _______________ FINDINGS: SUPPORT DEVICES: None. HEART AND MEDIASTINUM: Cardiomediastinal silhouette within normal limits. LUNGS AND PLEURAL SPACES: No dense consolidation, large effusion or pneumothorax. _______________     No acute cardiopulmonary abnormality. ECHO ADULT COMPLETE    Result Date: 7/21/2021  · LV: Estimated LVEF is 55 - 60%. Normal cavity size, wall thickness and systolic function (ejection fraction normal). Age-appropriate left ventricular diastolic function E/E' ratio is 8.77. · TV: Tricuspid valve not well visualized. No stenosis. Tricuspid regurgitation is inadequate for estimation of right ventricular systolic pressure. NUCLEAR CARDIAC STRESS TEST    Result Date: 7/22/2021  · Baseline ECG: Sinus rhythm, non-specific ST-T wave abnormalities. Sinus rhythm with nonspecific ST and T changes. · Stress test: Negative stress test. Low risk Duke treadmill score. Exercise stress test: EKG stress test results correlate with a low risk of inducible myocardial ischemia. · SPECT: Left ventricular function post-stress was normal. Calculated ejection fraction is 75%. There is no evidence of transient ischemic dilation (TID). · SPECT: Myocardial perfusion imaging defect 1: caused by soft tissue.  · SPECT: Left ventricular perfusion is normal. Myocardial perfusion imaging supports a low risk stress test.          No results found for this or any previous visit.         CC: Raymond Santiago, DO

## 2021-07-22 NOTE — ROUTINE PROCESS
Bedside and Verbal shift change report given to Aram Pascual RN  (oncoming nurse) by Reji Caballero RN  (offgoing nurse). Report given with SBAR, Kardex, Intake/Output and Recent Results.

## 2021-07-22 NOTE — ACP (ADVANCE CARE PLANNING)
Advance Care Planning   Advance Care Planning Inpatient Note  [facility] Department    Today's Date: 7/22/2021  Unit: THE [address] CARDIAC/MEDICAL    Received request from admission screening. Upon review of chart and communication with care team, patient's decision making abilities are not in question. Patient and Spouse was/were present in the room during visit. Goals of ACP Conversation:  Discuss Advance Care planning documents    Health Care Decision Makers:      Click here to complete [address] including selection of the Healthcare Decision Maker Relationship (ie \"Primary\")     Today we: Advance Care Planning Documents (Patient Wishes) on file:  None       Outcomes/Plan:  Patient stated she is not interested in ACP information at this time.      Electronically signed by Yonny Maloney [address] [place] [address] on 7/22/2021 at 3:11 PM

## 2021-07-22 NOTE — PROGRESS NOTES
conducted an initial consultation and Spiritual Assessment for Benja Edwards, who is a 64 y.o.,female. Patients Primary Language is: [place]. According to the patients EMR Zoroastrianism Affiliation is: [facility].     The reason the Patient came to the hospital is:   Patient Active Problem List    Diagnosis Date Noted    Chest pain 07/21/2021    Hypertension     GERD (gastroesophageal reflux disease)         The  provided the following Interventions:  Initiated a relationship of care and support with patient and family present. Explored issues of carlota, belief, spirituality and Gnosticism/ritual needs while hospitalized. Listened empathically. Addressed patient's Ancilary request for an ACP discussion. Patient stated she has no interest in completing and Advance Medical Directive. .  Provided information about Spiritual Care Services. Offered assurance of prayer on patient's behalf. Chart reviewed. The following outcomes where achieved:  Patient shared limited information about both their medical narrative and spiritual journey/beliefs.  confirmed Patient's Zoroastrianism Affiliation. Patient processed feeling about current hospitalization. Patient expressed gratitude for 's visit. Assessment:  Patient does not have any Gnosticism/cultural needs that will affect patients preferences in health care. Plan:  Chaplains will continue to follow and will provide pastoral care on an as needed/requested basis.  recommends bedside caregivers page  on duty if patient shows signs of acute spiritual or emotional distress. Rev.  Bobbi Man, Certified Respecting [address] [place] [address] Consultant  [facility]  [phone]

## 2021-07-22 NOTE — PROGRESS NOTES
Problem: Falls - Risk of  Goal: *Absence of Falls  Description: Document [place] Fall Risk and appropriate interventions in the flowsheet.   Outcome: Progressing Towards Goal  Note: Fall Risk Interventions:                                Problem: Patient Education: Go to Patient Education Activity  Goal: Patient/Family Education  Outcome: Progressing Towards Goal     Problem: Unstable angina/NSTEMI: Day of Admission/Day 1  Goal: Diagnostic Test/Procedures  Outcome: Progressing Towards Goal

## 2021-07-22 NOTE — PROGRESS NOTES
Hospitalist Progress Note    Patient: Roberto Alba MRN: [id number]  CSN: [id number]    YOB: 1960  Age: 64 y.o. Sex: female    DOA: 7/21/2021 LOS:  LOS: 0 days          Chief Complaint:    Chest pain    Assessment/Plan   Roberto Alba is a 64 y.o. female with history of hypertension and family history of coronary artery disease presented to ER due to chest pain. Admitted for chest pain and accelerated hypertension. Chest pain-  Appreciate cardiology consult  ACS ruled out  Echo done, normal wall motion and EF  Exercise nuclear stress test today  Continue antihypertensive treatment with amlodipine     HTN, accelerated -  Blood pressure now controlled on amlodipine     GERD-  Continue PPI     Disposition :  Patient Active Problem List   Diagnosis Code    Chest pain R07.9    Hypertension I10    GERD (gastroesophageal reflux disease) K21.9       Subjective:    Complaining of recurrent scratchy uncomfortable heavy feeling in her chest.  No new chest pain overnight and this morning. Waiting to get her stress test today. Review of systems:    Constitutional: denies fevers, chills, myalgias  Respiratory: denies SOB, cough  Cardiovascular: denies chest pain, palpitations  Gastrointestinal: denies nausea, vomiting, diarrhea      Vital signs/Intake and Output:  Visit Vitals  /69   Pulse 68   Temp 97.5 °F (36.4 °C)   Resp 16   Ht 5' 5\" (1.651 m)   Wt 88.9 kg (196 lb)   SpO2 100%   Breastfeeding No   BMI 32.62 kg/m²     Current Shift:  No intake/output data recorded. Last three shifts:  No intake/output data recorded.     Exam:    General: Well developed, alert, NAD, OX3  Head/Neck: NCAT, supple, No masses, No lymphadenopathy  CVS:Regular rate and rhythm, no M/R/G, S1/S2 heard, no thrill  Lungs:Clear to auscultation bilaterally, no wheezes, rhonchi, or rales  Abdomen: Soft, Nontender, No distention, Normal Bowel sounds, No hepatomegaly  Extremities: No C/C/E, pulses palpable 2+  Skin:normal texture and turgor, no rashes, no lesions  Neuro:grossly normal , follows commands  Psych:appropriate                Labs: Results:       Chemistry Recent Labs     07/22/21  0110 07/21/21 0930   GLU 85 86    139   K 3.3* 3.9    105   CO2 29 30   BUN 12 12   CREA 0.72 0.85   CA 8.4* 9.0   AGAP 5 4   BUCR 17 14   AP  --  89   TP  --  8.1   ALB  --  3.8   GLOB  --  4.3*   AGRAT  --  0.9      CBC w/Diff Recent Labs     07/21/21 0930   WBC 6.9   RBC 3.91*   HGB 11.7*   HCT 35.2      GRANS 48   LYMPH 44   EOS 1      Cardiac Enzymes Recent Labs     07/22/21  0740 07/22/21 0110    126   CKND1 CALCULATION NOT PERFORMED WHEN RESULT IS BELOW LINEAR LIMIT CALCULATION NOT PERFORMED WHEN RESULT IS BELOW LINEAR LIMIT      Coagulation No results for input(s): PTP, INR, APTT, INREXT in the last 72 hours. Lipid Panel No results found for: CHOL, CHOLPOCT, CHOLX, CHLST, CHOLV, 351311, HDL, HDLP, LDL, LDLC, DLDLP, 322675, VLDLC, VLDL, TGLX, TRIGL, TRIGP, TGLPOCT, CHHD, CHHDX   BNP No results for input(s): BNPP in the last 72 hours.    Liver Enzymes Recent Labs     07/21/21  0930   TP 8.1   ALB 3.8   AP 89      Thyroid Studies No results found for: T4, T3U, TSH, TSHEXT     Procedures/imaging: see electronic medical records for all procedures/Xrays and details which were not copied into this note but were reviewed prior to creation of William Cobos MD

## 2021-07-22 NOTE — PROGRESS NOTES
Cardiology Progress Note        Patient: Erik Murray        Sex: female          DOA: 7/21/2021  YOB: 1960      Age:  64 y.o.        LOS:  LOS: 0 days   Assessment/Plan     Principal Problem:    Chest pain (7/21/2021)    Active Problems:    Hypertension ()      GERD (gastroesophageal reflux disease) ()        Plan:    Chest pain-free  ACS ruled out  Stress test was negative for ischemia  Symptoms are nonischemic in nature  Discussed with patient and   Continue with risk factor management  Patient can try pain medication with Tylenol/nonsteroidal anti-inflammatory medicine depending upon acid reflux symptoms as well. Patient can be discharged from cardiac standpoint. Subjective:    cc:  Chest pain        REVIEW OF SYSTEMS:     General: No fevers or chills. Cardiovascular: No chest pain or pressure. No palpitations. No ankle swelling  Pulmonary: No SOB, orthopnea, PND  Gastrointestinal: No nausea, vomiting or diarrhea      Objective:      Visit Vitals  /69   Pulse 88   Temp 97.5 °F (36.4 °C)   Resp 16   Ht 5' 5\" (1.651 m)   Wt 88.9 kg (196 lb)   SpO2 100%   Breastfeeding No   BMI 32.62 kg/m²     Body mass index is 32.62 kg/m². Physical Exam:  General Appearance: Comfortable, not using accessory muscles of respiration. NECK: No JVD, no thyroidomeglay. LUNGS: Clear bilaterally. HEART: S1+S2 audible,    ABD: Non-tender, BS Audible    EXT: No edema, and no cysnosis. VASCULAR EXAM: Pulses are intact. PSYCHIATRIC EXAM: Mood is appropriate.     Medication:  Current Facility-Administered Medications   Medication Dose Route Frequency    aspirin chewable tablet 81 mg  81 mg Oral DAILY    nitroglycerin (NITROSTAT) tablet 0.4 mg  0.4 mg SubLINGual Q5MIN PRN    acetaminophen (TYLENOL) tablet 650 mg  650 mg Oral Q4H PRN    enoxaparin (LOVENOX) injection 40 mg  40 mg SubCUTAneous Q24H    amLODIPine (NORVASC) tablet 10 mg  10 mg Oral DAILY  pantoprazole (PROTONIX) tablet 40 mg  40 mg Oral ACB    nitroglycerin (NITRODUR) 0.1 mg/hr patch 1 Patch  1 Patch TransDERmal DAILY    acetaminophen (TYLENOL) tablet 650 mg  650 mg Oral BID               Lab/Data Reviewed:  Procedures/imaging: see electronic medical records for all procedures/Xrays   and details which were not copied into this note but were reviewed prior to creation of Plan       All lab results for the last 24 hours reviewed. Recent Labs     07/21/21  0930   WBC 6.9   HGB 11.7*   HCT 35.2        Recent Labs     07/22/21  0110 07/21/21  0930    139   K 3.3* 3.9    105   CO2 29 30   GLU 85 86   BUN 12 12   CREA 0.72 0.85   CA 8.4* 9.0       RADIOLOGY:  CT Results  (Last 48 hours)    None        CXR Results  (Last 48 hours)               07/21/21 0914  XR CHEST PORT Final result    Impression:      No acute cardiopulmonary abnormality. Narrative:  EXAM: XR CHEST PORT       CLINICAL INDICATION/HISTORY: chest pain   -Additional: None       COMPARISON: None       TECHNIQUE: Portable frontal view of the chest       _______________       FINDINGS:       SUPPORT DEVICES: None. HEART AND MEDIASTINUM: Cardiomediastinal silhouette within normal limits.        LUNGS AND PLEURAL SPACES: No dense consolidation, large effusion or   pneumothorax.       _______________                   Cardiology Procedures:   Results for orders placed or performed during the hospital encounter of 07/21/21   EKG, 12 LEAD, INITIAL   Result Value Ref Range    Ventricular Rate 68 BPM    Atrial Rate 68 BPM    P-R Interval 104 ms    QRS Duration 90 ms    Q-T Interval 442 ms    QTC Calculation (Bezet) 469 ms    Calculated P Axis 30 degrees    Calculated R Axis 34 degrees    Calculated T Axis 59 degrees    Diagnosis       Sinus rhythm with short RI  Borderline ECG  No previous ECGs available  Confirmed by Becky Miller MD. ([id number]) on 7/21/2021 10:14:55 PM        Echo Results  (Last 48 hours) None       Cardiolite (Tc-99m Sestamibi) stress test    Signed By: Danuta Whitten MD     July 22, 2021

## 2021-07-22 NOTE — PROGRESS NOTES
Reason for Admission: Chest pain    Chart reviewed. Per H&P: Alejandra Hsu is a 64 y.o. female with history of hypertension presented to ER due to chest pain this morning. The chest pain waking her up this morning. It was sharp pain left side of the chest, no palpitation, no shortness of breath. She rested her self and chest pain did not go away. She decided to come to the hospital. She reported that she had on and off chest pain before , but resolved per its own. It does not go away this time. On arrival, Aspirin was administrated with  Nitro and GI cocktail which  helps with the pain. First cardiac enzyme and D-dimer were negative. EKG no ST segment changes. Chest x-ray no acute process. She reported she had gerd, drinks alcohol occasionally. Quitted smoking several years ago. Mother  from heart attack at age of 48. She had covid last December-not required hospitalization. She received covid vaccination.  was at the bedside. \"        Care Management/Patient/Family Conversation: 9660: Chart reviewed. CM spoke with the patient and her  at the bedside and informed them both of the CM's role. The patient confirmed demographics and PCP. CM and the patient discussed discharge plans to include transportation upon discharge, DME, family support, and transportation to and from appointments. The patient states that she lives with her  and that she was independent at home prior to this admission, driving herself places and not using any DME for ambulation. The patient's  will be available to drive her home upon discharge. The patient and her  deny any questions or concerns at this time. CM provided the patient with this CM's contact information and will continue to follow the patient's progress and be available to assist with discharge planning as needed. CMS referral placed.    Oral and Written notification given to patient and/or caregiver informing them that they are currently an Outpatient receiving care in our facility. Outpatient services include Observation Services under [place] and [place] requirements. Prior to admission patient was living: Home with     Prior to admission patient was using the following DME: None                    RUR Score: NA                   Plan for utilizing home health: TBD         COVID Vaccine Status: Fully vaccinated - Luis Dickey    PCP: First and Last name: Dr. Jerrell Fernandez   Name of Practice: [address] [place] [address] Place   Are you a current patient: Yes/No: 7/15/21 per patient   Approximate date of last visit:    Can you participate in a virtual visit with your PCP:                     Current Advanced Directive/Advance Care Plan: Full Code no ACP docs on file    Healthcare Decision Maker: : Amelia Sosa: [phone]                         Transition of Care Plan: In progress       Care Management Interventions  PCP Verified by CM: Yes  Mode of Transport at Discharge:  Other (see comment) ()  Transition of Care Consult (CM Consult): Discharge Planning  Current Support Network: Lives with Spouse  Confirm Follow Up Transport: Family  The Plan for Transition of Care is Related to the Following Treatment Goals : Chest pain  Discharge Location  Discharge Placement: Home with family assistance